# Patient Record
Sex: FEMALE
[De-identification: names, ages, dates, MRNs, and addresses within clinical notes are randomized per-mention and may not be internally consistent; named-entity substitution may affect disease eponyms.]

---

## 2017-06-12 ENCOUNTER — HOSPITAL ENCOUNTER (EMERGENCY)
Dept: HOSPITAL 31 - C.ER | Age: 37
Discharge: HOME | End: 2017-06-12
Payer: MEDICAID

## 2017-06-12 VITALS — BODY MASS INDEX: 20.7 KG/M2

## 2017-06-12 VITALS — TEMPERATURE: 98.6 F | DIASTOLIC BLOOD PRESSURE: 89 MMHG | SYSTOLIC BLOOD PRESSURE: 126 MMHG | RESPIRATION RATE: 18 BRPM

## 2017-06-12 VITALS — HEART RATE: 72 BPM

## 2017-06-12 VITALS — OXYGEN SATURATION: 97 %

## 2017-06-12 DIAGNOSIS — M25.522: ICD-10-CM

## 2017-06-12 DIAGNOSIS — G89.29: Primary | ICD-10-CM

## 2017-06-12 NOTE — C.PDOC
History Of Present Illness


37 y/o female presents to ED with complaints of intermittent left arm pain for 

1 year. Patient states location is outside of left elbow and occasionally over 

AC joint intermittent. Pain is described as sharp and lasts seconds but does 

not change with movement or position. Patient previously had a stress test and 

ECHO 6 months ago for similar symptoms. Patient is currently asymptomatic and 

denies trauma or any other complaints at this time. 











INTERMIT PAIN L ARM X 1 YR. OUTSIDE OF L ELBOW, OCCASIONALLY OVER AC JOINT. 

INTERMIT, NO ASSOC W MOVEMENT OR POSITION. SHARP, LASTING SECONDS. CURRENTLY 

ASYMPT. NO OTHER ASSOC SX, TRAUMA. PS HAD STRESS TEST, ECHO 6 MO AGO FOR SAME 

SX.





EXAM


NEG





MDM


REFER HAND SURG, PMD ADVISED POSSIBLE NEED FOR MRI CSPINE OTHERWISE C/W 

TENDONITIS


Time Seen by Provider: 17 12:08


Chief Complaint (Nursing): Upper Extremity Problem/Injury


History Per: Patient


History/Exam Limitations: no limitations


Onset/Duration Of Symptoms: Days


Current Symptoms Are (Timing): Still Present


Quality: Sharp





Past Medical History


Reviewed: Historical Data, Nursing Documentation, Vital Signs


Vital Signs: 


 Last Vital Signs











Temp  98.6 F   17 11:39


 


Pulse  72   17 12:52


 


Resp  18   17 12:52


 


BP  126/89   17 11:39


 


Pulse Ox  97   17 13:38











Surgical History: 


Family History: States: Unknown Family Hx





- Social History


Hx Tobacco Use: No


Hx Alcohol Use: No


Hx Substance Use: No





- Immunization History


Hx Tetanus Toxoid Vaccination: No


Hx Influenza Vaccination: No


Hx Pneumococcal Vaccination: No





Review Of Systems


Except As Marked, All Systems Reviewed And Found Negative.


Constitutional: Negative for: Fever, Chills


Cardiovascular: Negative for: Chest Pain


Respiratory: Negative for: Shortness of Breath


Gastrointestinal: Negative for: Nausea, Vomiting, Diarrhea


Musculoskeletal: Positive for: Arm Pain


Skin: Negative for: Rash


Neurological: Negative for: Weakness, Headache





Physical Exam





- Physical Exam


Appears: Non-toxic, No Acute Distress


Skin: Normal Color, Warm


Head: Atraumatic, Normacephalic


Eye(s): bilateral: Normal Inspection


Oral Mucosa: Moist


Neck: Normal ROM


Cardiovascular: Rhythm Regular, No Murmur


Respiratory: No Rales, No Rhonchi, No Wheezing


Gastrointestinal/Abdominal: Soft, No Tenderness, No Guarding, No Rebound


Extremity: Normal ROM, Capillary Refill (<2 seconds)


Neurological/Psych: Oriented x3, Normal Speech, Normal Cognition





ED Course And Treatment


O2 Sat by Pulse Oximetry: 97 (RA)


Pulse Ox Interpretation: Normal





Medical Decision Making


Medical Decision Making: 


Patient referred to hand surgery and PMD advised possible need for MRI, C-spine 

other wise C/W Tendonitis





Disposition


Counseled Patient/Family Regarding: Diagnosis, Need For Followup





- Disposition


Referrals: 


Bogdan Gasca MD [Primary Care Provider] - 


Bonnie Cruz MD [Staff Provider] - 


Disposition: HOME/ ROUTINE


Disposition Time: 12:39


Condition: GOOD


Instructions:  Tennis Elbow (ED), Cervical Radiculopathy (ED)





- Clinical Impression


Clinical Impression: 


 Chronic elbow pain








- PA / NP / Resident Statement


MD/ has reviewed & agrees with the documentation as recorded.


MD/ has examined the patient and agrees with the treatment plan.





- Scribe Statement


The provider has reviewed the documentation as recorded by the Jimmy Mccormick





All medical record entries made by the Jimmy were at my direction and 

personally dictated by me. I have reviewed the chart and agree that the record 

accurately reflects my personal performance of the history, physical exam, 

medical decision making, and the department course for this patient. I have 

also personally directed, reviewed, and agree with the discharge instructions 

and disposition.

## 2018-04-28 ENCOUNTER — HOSPITAL ENCOUNTER (EMERGENCY)
Dept: HOSPITAL 31 - C.ER | Age: 38
Discharge: HOME | End: 2018-04-28
Payer: MEDICAID

## 2018-04-28 VITALS
DIASTOLIC BLOOD PRESSURE: 80 MMHG | HEART RATE: 94 BPM | SYSTOLIC BLOOD PRESSURE: 121 MMHG | RESPIRATION RATE: 16 BRPM | TEMPERATURE: 98.9 F

## 2018-04-28 VITALS — BODY MASS INDEX: 20.7 KG/M2

## 2018-04-28 DIAGNOSIS — R20.2: Primary | ICD-10-CM

## 2018-04-28 NOTE — C.PDOC
History Of Present Illness


37 year old female presents to the emergency department with complaints of 

numbness to the palmar aspect of her left hand experienced while she was 

putting on makeup. Patient reports that her numbness has since subsided and now 

she only reports a tingly sensation. 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 

typing, and that she has a history of tendinitis in her left arm, and stated 

that she had pain in her left elbow last night which presented similarly to the 

time of her tendinitis. Patient states she put warm compress on her elbow, with 

pain eventually relief. Patient denies any other numbness, such as facial 

numbness/weakness, speech problems, or a headache. Patient states that she 

decided to present to the ED due to fear of having a CVA.


Time Seen by Provider: 18 08:04


Chief Complaint (Nursing): Weakness/Neurological Deficit


History Per: Patient


Quality: Other (numbness/tingling)





Past Medical History


Reviewed: Historical Data, Nursing Documentation, Vital Signs


Vital Signs: 


 Last Vital Signs











Temp  98.9 F   18 09:55


 


Pulse  94 H  18 09:55


 


Resp  16   18 09:55


 


BP  121/80   18 09:55


 


Pulse Ox  98   18 09:55














- Medical History


PMH: No Chronic Diseases


Surgical History: No Surg Hx, 


Family History: States: No Known Family Hx





- Social History


Hx Tobacco Use: No


Hx Alcohol Use: No


Hx Substance Use: No





- Immunization History


Hx Tetanus Toxoid Vaccination: No


Hx Influenza Vaccination: No


Hx Pneumococcal Vaccination: No





Review Of Systems


Except As Marked, All Systems Reviewed And Found Negative.


Neurological: Negative for: Weakness, Numbness (facial numbness), Change in 

Speech, Headache





Physical Exam





- Physical Exam


Appears: Non-toxic, No Acute Distress


Cardiovascular: Rhythm Regular


Respiratory: Normal Breath Sounds


Extremity: No Other (no numbness to the palmar aspect of the left hand.)


Neurological/Psych: Oriented x3, Normal Speech, Normal Cognition, Normal Motor, 

Normal Sensation, Normal Reflexes





ED Course And Treatment


O2 Sat by Pulse Oximetry: 100 (RA)


Pulse Ox Interpretation: Normal





- CT Scan/US


  ** CT head


Other Rad Studies (CT/US): Read By Radiologist, Radiology Report Reviewed


CT/US Interpretation: Accession No. : [id number].  Patient Name / ID : 

RIGO MURRY  / [id number].  Exam Date : 2018 08:50:53 ( Approved 

).  Study Comment :  Sex / Age : F  / 37Y.  Creator : Werner Glover MD.  

Dictator :   :  Approver : Werner Glover MD.  Approver2 :  

Report Date : 2018 09:36:22.  My Comment :  ******************************

*****************************************************.  This report is 

currently processing and HAS NOT BEEN OFFICIALLY SIGNED BY THE PHYSICIAN - 

ESTIMATED TIME OF APPROVAL IS 2018 09:44.  PROCEDURE:  CT HEAD WITHOUT 

CONTRAST.  HISTORY:  Left hand numbness.  COMPARISON:  Comparison made with CT 

scan brain 2015.  TECHNIQUE:  Axial computed tomography images were 

obtained through the head/brain without intravenous contrast.  Radiation dose:  

Total exam DLP = 874.8 mGy-cm.  This CT exam was performed using one or more of 

the following dose reduction techniques: Automated exposure control, adjustment 

of the mA and/or kV according to patient size, and/or use of iterative 

reconstruction technique.  FINDINGS:  HEMORRHAGE:  No intracranial hemorrhage.  

BRAIN:  No mass effect or edema.  No atrophy or chronic microvascular ischemic 

changes.  VENTRICLES:  Unremarkable. No hydrocephalus.  CALVARIUM:  No acute 

calvarial fractures are identified. Re- demonstrated is a small exostosis or 

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  PARANASAL 

SINUSES:  Unremarkable as visualized. No significant inflammatory changes.  

MASTOID AIR CELLS:  Unremarkable as visualized. No inflammatory changes.  OTHER 

FINDINGS:  None.  IMPRESSION:  No acute intracranial hemorrhage


Progress Note: Patient was not experiencing any numbness on exam.  Patient is 

concerned about having a CVA.  Plan:  CT Scan of Head w/o contrast to rule out 

CVA.  CT head was negative, patient will be d/c home with instructions to f/u 

with neurologist .





Disposition





- Disposition


Referrals: 


Sampson Baires MD [Staff Provider] - 


Disposition: HOME/ ROUTINE


Disposition Time: 09:45


Condition: STABLE


Additional Instructions: 


Follow up with Neurologoist within 1-2 days. Return to ED if feel worse. Wrist 

splint as discussed. 


Instructions:  Hand Numbness


Forms:  [email or web address] (English)





- Clinical Impression


Clinical Impression: 


 Numbness of left hand








- PA / NP / Resident Statement


MD/DO has reviewed & agrees with the documentation as recorded.





- Scribe Statement


The provider has reviewed the documentation as recorded by the Scribe (Washington Gamez)


All medical record entries made by the Scribe were at my direction and 

personally dictated by me. I have reviewed the chart and agree that the record 

accurately reflects my personal performance of the history, physical exam, 

medical decision making, and the department course for this patient. I have 

also personally directed, reviewed, and agree with the discharge instructions 

and disposition.

## 2018-04-28 NOTE — CT
PROCEDURE:  CT HEAD WITHOUT CONTRAST.



HISTORY:

Left hand numbness



COMPARISON:

Comparison made with CT scan brain 01/16/2015 



TECHNIQUE:

Axial computed tomography images were obtained through the head/brain 

without intravenous contrast.  



Radiation dose:



Total exam DLP = 874.8 mGy-cm.



This CT exam was performed using one or more of the following dose 

reduction techniques: Automated exposure control, adjustment of the 

mA and/or kV according to patient size, and/or use of iterative 

reconstruction technique.



FINDINGS:



HEMORRHAGE:

No intracranial hemorrhage. 



BRAIN:

No mass effect or edema.  No atrophy or chronic microvascular 

ischemic changes.



VENTRICLES:

Unremarkable. No hydrocephalus. 



CALVARIUM:

No acute calvarial fractures are identified. Re- demonstrated is a 

small exostosis or osteoma supra table of the left parasagittal 

occipital calvarium 



PARANASAL SINUSES:

Unremarkable as visualized. No significant inflammatory changes.



MASTOID AIR CELLS:

Unremarkable as visualized. No inflammatory changes.



OTHER FINDINGS:

None.



IMPRESSION:

No acute intracranial hemorrhage

## 2018-04-29 VITALS — OXYGEN SATURATION: 100 %
